# Patient Record
Sex: MALE | Race: WHITE | Employment: FULL TIME | URBAN - METROPOLITAN AREA
[De-identification: names, ages, dates, MRNs, and addresses within clinical notes are randomized per-mention and may not be internally consistent; named-entity substitution may affect disease eponyms.]

---

## 2017-01-17 PROBLEM — L72.3 SEBACEOUS CYST: Status: ACTIVE | Noted: 2017-01-17

## 2017-01-17 PROBLEM — K40.90 INGUINAL RING LAXITY: Status: ACTIVE | Noted: 2017-01-17

## 2017-01-17 PROBLEM — K64.9 HEMORRHOIDS: Status: ACTIVE | Noted: 2017-01-17

## 2017-01-17 PROBLEM — N40.1 BENIGN NON-NODULAR PROSTATIC HYPERPLASIA WITH LOWER URINARY TRACT SYMPTOMS: Chronic | Status: ACTIVE | Noted: 2017-01-17

## 2018-01-18 PROBLEM — K40.90 INGUINAL RING LAXITY: Chronic | Status: ACTIVE | Noted: 2017-01-17

## 2020-01-09 ENCOUNTER — HOSPITAL ENCOUNTER (OUTPATIENT)
Dept: GENERAL RADIOLOGY | Age: 65
Discharge: HOME | End: 2020-01-09

## 2020-01-09 DIAGNOSIS — Z00.00 ROUTINE GENERAL MEDICAL EXAMINATION AT A HEALTH CARE FACILITY: ICD-10-CM

## 2022-03-18 PROBLEM — N40.1 BENIGN NON-NODULAR PROSTATIC HYPERPLASIA WITH LOWER URINARY TRACT SYMPTOMS: Status: ACTIVE | Noted: 2017-01-17

## 2022-03-19 PROBLEM — K64.9 HEMORRHOIDS: Status: ACTIVE | Noted: 2017-01-17

## 2022-03-20 PROBLEM — L72.3 SEBACEOUS CYST: Status: ACTIVE | Noted: 2017-01-17

## 2022-03-20 PROBLEM — K40.90 INGUINAL RING LAXITY: Status: ACTIVE | Noted: 2017-01-17

## 2022-05-31 DIAGNOSIS — R05.8 ACE-INHIBITOR COUGH: Primary | ICD-10-CM

## 2022-05-31 DIAGNOSIS — R05.3 PERSISTENT DRY COUGH: ICD-10-CM

## 2022-05-31 DIAGNOSIS — T46.4X5A ACE-INHIBITOR COUGH: Primary | ICD-10-CM

## 2022-05-31 DIAGNOSIS — J92.9 PLEURAL THICKENING: ICD-10-CM

## 2022-06-06 ENCOUNTER — HOSPITAL ENCOUNTER (OUTPATIENT)
Dept: CT IMAGING | Age: 67
Discharge: HOME | End: 2022-06-09
Payer: COMMERCIAL

## 2022-06-06 DIAGNOSIS — J92.9 PLEURAL THICKENING: ICD-10-CM

## 2022-06-06 DIAGNOSIS — R05.9 COUGH: ICD-10-CM

## 2022-06-06 PROCEDURE — 71250 CT THORAX DX C-: CPT

## 2022-09-12 ENCOUNTER — TELEPHONE (OUTPATIENT)
Dept: INTERNAL MEDICINE CLINIC | Facility: CLINIC | Age: 67
End: 2022-09-12

## 2022-09-12 NOTE — TELEPHONE ENCOUNTER
----- Message from Tobi Hauser sent at 9/12/2022 12:04 PM EDT -----  Subject: Referral Request    Reason for referral request? labs for awv on 4/10/23  Provider patient wants to be referred to(if known):     Provider Phone Number(if known): Additional Information for Provider?  please call pt to make the appt when   labs have been put in  ---------------------------------------------------------------------------  --------------  [id number]    [phone]; OK to leave message on voicemail  ---------------------------------------------------------------------------  --------------

## 2022-09-12 NOTE — TELEPHONE ENCOUNTER
Pt notified that AWV does not include labs. He has scheduled an office visit also with Dr Edie Chapin.

## 2023-05-29 ASSESSMENT — PATIENT HEALTH QUESTIONNAIRE - PHQ9
SUM OF ALL RESPONSES TO PHQ QUESTIONS 1-9: 1
2. FEELING DOWN, DEPRESSED OR HOPELESS: 1
SUM OF ALL RESPONSES TO PHQ QUESTIONS 1-9: 1
1. LITTLE INTEREST OR PLEASURE IN DOING THINGS: NOT AT ALL
SUM OF ALL RESPONSES TO PHQ9 QUESTIONS 1 & 2: 1
2. FEELING DOWN, DEPRESSED OR HOPELESS: SEVERAL DAYS
SUM OF ALL RESPONSES TO PHQ QUESTIONS 1-9: 1
SUM OF ALL RESPONSES TO PHQ QUESTIONS 1-9: 1
1. LITTLE INTEREST OR PLEASURE IN DOING THINGS: 0
SUM OF ALL RESPONSES TO PHQ9 QUESTIONS 1 & 2: 1

## 2023-06-01 ENCOUNTER — HOSPITAL ENCOUNTER (OUTPATIENT)
Dept: GENERAL RADIOLOGY | Age: 68
Discharge: HOME | End: 2023-06-04

## 2023-06-01 ENCOUNTER — OFFICE VISIT (OUTPATIENT)
Dept: INTERNAL MEDICINE CLINIC | Facility: CLINIC | Age: 68
End: 2023-06-01
Payer: MEDICARE

## 2023-06-01 VITALS
SYSTOLIC BLOOD PRESSURE: 134 MMHG | DIASTOLIC BLOOD PRESSURE: 70 MMHG | HEART RATE: 81 BPM | HEIGHT: 74 IN | OXYGEN SATURATION: 98 % | BODY MASS INDEX: 26.21 KG/M2 | WEIGHT: 204.2 LBS

## 2023-06-01 DIAGNOSIS — Z00.00 ANNUAL PHYSICAL EXAM: ICD-10-CM

## 2023-06-01 DIAGNOSIS — N40.0 BENIGN PROSTATIC HYPERPLASIA WITHOUT LOWER URINARY TRACT SYMPTOMS: ICD-10-CM

## 2023-06-01 DIAGNOSIS — Z11.59 NEED FOR HEPATITIS C SCREENING TEST: Primary | ICD-10-CM

## 2023-06-01 DIAGNOSIS — R05.9 COUGH, UNSPECIFIED TYPE: ICD-10-CM

## 2023-06-01 DIAGNOSIS — Z11.59 NEED FOR HEPATITIS C SCREENING TEST: ICD-10-CM

## 2023-06-01 LAB
ALBUMIN SERPL-MCNC: 3.8 G/DL (ref 3.2–4.6)
ALBUMIN/GLOB SERPL: 1.5 (ref 0.4–1.6)
ALP SERPL-CCNC: 52 U/L (ref 50–136)
ALT SERPL-CCNC: 38 U/L (ref 12–65)
ANION GAP SERPL CALC-SCNC: 6 MMOL/L (ref 2–11)
APPEARANCE UR: CLEAR
AST SERPL-CCNC: 27 U/L (ref 15–37)
BACTERIA URNS QL MICRO: NEGATIVE /HPF
BASOPHILS # BLD: 0 K/UL (ref 0–0.2)
BASOPHILS NFR BLD: 1 % (ref 0–2)
BILIRUB SERPL-MCNC: 0.9 MG/DL (ref 0.2–1.1)
BILIRUB UR QL: NEGATIVE
BUN SERPL-MCNC: 15 MG/DL (ref 8–23)
CALCIUM SERPL-MCNC: 9.6 MG/DL (ref 8.3–10.4)
CASTS URNS QL MICRO: NORMAL /LPF (ref 0–2)
CHLORIDE SERPL-SCNC: 108 MMOL/L (ref 101–110)
CHOLEST SERPL-MCNC: 153 MG/DL
CO2 SERPL-SCNC: 27 MMOL/L (ref 21–32)
COLOR UR: NORMAL
CREAT SERPL-MCNC: 1 MG/DL (ref 0.8–1.5)
DIFFERENTIAL METHOD BLD: ABNORMAL
EOSINOPHIL # BLD: 0.1 K/UL (ref 0–0.8)
EOSINOPHIL NFR BLD: 1 % (ref 0.5–7.8)
EPI CELLS #/AREA URNS HPF: NORMAL /HPF (ref 0–5)
ERYTHROCYTE [DISTWIDTH] IN BLOOD BY AUTOMATED COUNT: 13.1 % (ref 11.9–14.6)
GLOBULIN SER CALC-MCNC: 2.5 G/DL (ref 2.8–4.5)
GLUCOSE SERPL-MCNC: 90 MG/DL (ref 65–100)
GLUCOSE UR STRIP.AUTO-MCNC: NEGATIVE MG/DL
HCT VFR BLD AUTO: 45.5 % (ref 41.1–50.3)
HCV AB SER QL: NONREACTIVE
HDLC SERPL-MCNC: 60 MG/DL (ref 40–60)
HDLC SERPL: 2.6
HGB BLD-MCNC: 15 G/DL (ref 13.6–17.2)
HGB UR QL STRIP: NEGATIVE
IMM GRANULOCYTES # BLD AUTO: 0 K/UL (ref 0–0.5)
IMM GRANULOCYTES NFR BLD AUTO: 0 % (ref 0–5)
KETONES UR QL STRIP.AUTO: NEGATIVE MG/DL
LDLC SERPL CALC-MCNC: 82.2 MG/DL
LEUKOCYTE ESTERASE UR QL STRIP.AUTO: NEGATIVE
LYMPHOCYTES # BLD: 1 K/UL (ref 0.5–4.6)
LYMPHOCYTES NFR BLD: 22 % (ref 13–44)
MCH RBC QN AUTO: 31.1 PG (ref 26.1–32.9)
MCHC RBC AUTO-ENTMCNC: 33 G/DL (ref 31.4–35)
MCV RBC AUTO: 94.2 FL (ref 82–102)
MONOCYTES # BLD: 0.7 K/UL (ref 0.1–1.3)
MONOCYTES NFR BLD: 14 % (ref 4–12)
MUCOUS THREADS URNS QL MICRO: 0 /LPF
NEUTS SEG # BLD: 2.9 K/UL (ref 1.7–8.2)
NEUTS SEG NFR BLD: 61 % (ref 43–78)
NITRITE UR QL STRIP.AUTO: NEGATIVE
NRBC # BLD: 0 K/UL (ref 0–0.2)
PH UR STRIP: 6.5 (ref 5–9)
PLATELET # BLD AUTO: 139 K/UL (ref 150–450)
PMV BLD AUTO: 11.3 FL (ref 9.4–12.3)
POTASSIUM SERPL-SCNC: 4.1 MMOL/L (ref 3.5–5.1)
PROT SERPL-MCNC: 6.3 G/DL (ref 6.3–8.2)
PROT UR STRIP-MCNC: NEGATIVE MG/DL
RBC # BLD AUTO: 4.83 M/UL (ref 4.23–5.6)
RBC #/AREA URNS HPF: NORMAL /HPF (ref 0–5)
SODIUM SERPL-SCNC: 141 MMOL/L (ref 133–143)
SP GR UR REFRACTOMETRY: 1.01 (ref 1–1.02)
TRIGL SERPL-MCNC: 54 MG/DL (ref 35–150)
TSH, 3RD GENERATION: 1.17 UIU/ML (ref 0.36–3.74)
URINE CULTURE IF INDICATED: NORMAL
UROBILINOGEN UR QL STRIP.AUTO: 0.2 EU/DL (ref 0.2–1)
VLDLC SERPL CALC-MCNC: 10.8 MG/DL (ref 6–23)
WBC # BLD AUTO: 4.7 K/UL (ref 4.3–11.1)
WBC URNS QL MICRO: NORMAL /HPF (ref 0–4)

## 2023-06-01 PROCEDURE — 99397 PER PM REEVAL EST PAT 65+ YR: CPT | Performed by: INTERNAL MEDICINE

## 2023-06-01 ASSESSMENT — ENCOUNTER SYMPTOMS
EYE PAIN: 0
WHEEZING: 0
EYE ITCHING: 0
ABDOMINAL PAIN: 0
ANAL BLEEDING: 0
NAUSEA: 0
DIARRHEA: 0
RHINORRHEA: 0
RESPIRATORY NEGATIVE: 1
EYE DISCHARGE: 0
CHOKING: 0
COLOR CHANGE: 0
STRIDOR: 0
ABDOMINAL DISTENTION: 0
BLOOD IN STOOL: 0
EYE REDNESS: 0
GASTROINTESTINAL NEGATIVE: 1
EYES NEGATIVE: 1
SINUS PRESSURE: 0
CONSTIPATION: 0
CHEST TIGHTNESS: 0
SHORTNESS OF BREATH: 0
BACK PAIN: 0
RECTAL PAIN: 0
ALLERGIC/IMMUNOLOGIC NEGATIVE: 1
COUGH: 0

## 2023-06-01 NOTE — PROGRESS NOTES
FOLLOW UP VISIT    Subjective:    Scotty Nguyen (: 1955) is a 74 y.o., male,   Chief Complaint   Patient presents with    Annual Exam     Fasting for labs       HPI:  HPI  Very nice 74year old in for cpe    The following portions of the patient's history were reviewed and updated as appropriate:      Past Medical History:   Diagnosis Date    Acute bronchitis     Acute prostatitis     Chronic rhinitis     Elevated prostate specific antigen (PSA)     Generalized osteoarthrosis, involving multiple sites     Insomnia 2015    Insomnia with sleep apnea, unspecified     Lumbago     Mitral regurgitation     Osteoarthritis 2015    Other abnormality of urination(788.69)     Pain in joint, multiple sites     Plantar fascial fibromatosis        Past Surgical History:   Procedure Laterality Date    COLONOSCOPY      neg    GI      HEMORRHOID SURGERY Bilateral 2017    LIPOMA RESECTION Right 1996    lower chest     TONSILLECTOMY      in childhood    Tran Wagner 15       Family History   Problem Relation Age of Onset    Obesity Sister     Coronary Art Dis Paternal Aunt     Cancer Mother         Skin, face    Osteoarthritis Mother     Hypertension Mother     Arthritis Mother     High Blood Pressure Mother     Diabetes Other         several family members on maternal side    Cancer Father         Skin, face    Hypertension Father     Atrial Fibrillation Father     Hearing Loss Father         Likely related to recreational firearm use    High Blood Pressure Father     High Cholesterol Father     Vision Loss Father         Macular degeneration; caught early, being treated    Diabetes Maternal Grandmother        Social History     Socioeconomic History    Marital status:      Spouse name: Not on file    Number of children: Not on file    Years of education: Not on file    Highest education level: Not on file   Occupational History    Not on file   Tobacco Use    Smoking status:

## 2023-06-02 ENCOUNTER — TELEPHONE (OUTPATIENT)
Dept: INTERNAL MEDICINE CLINIC | Facility: CLINIC | Age: 68
End: 2023-06-02

## 2023-06-02 DIAGNOSIS — D69.6 LOW PLATELET COUNT (HCC): Primary | ICD-10-CM

## 2023-06-02 LAB
PSA FREE MFR SERPL: 25.6 %
PSA FREE SERPL-MCNC: 1.1 NG/ML
PSA SERPL-MCNC: 4.3 NG/ML

## 2023-06-05 ENCOUNTER — PATIENT MESSAGE (OUTPATIENT)
Dept: INTERNAL MEDICINE CLINIC | Facility: CLINIC | Age: 68
End: 2023-06-05

## 2023-06-05 DIAGNOSIS — R97.20 HIGH PROSTATE SPECIFIC ANTIGEN (PSA): Primary | ICD-10-CM

## 2023-06-05 NOTE — TELEPHONE ENCOUNTER
From: Susanne Potts  To: Dr. Kiki Mehta: 6/5/2023 3:04 PM EDT  Subject: PSA results    I noted that my recent PSA result (4.3) is nominally above the 4.0 \"normal max\". My understanding of the results is results is that a new test method was used and that a new baseline needed to be established, so I'm NOT overly concerned about being above 4.0. In the past I've had a 4.0 PSA result which dropped in subsequent tests. Also, the free PSA > 25% indicates a lower likelyhood of prostate cancer. We also discussed during the physical that a PSA result around 4.2 range wasn't something that I was going to be concerned about. Sisi Mckenzie left a message with my wife that said you wanted a virtual call. At this point, I'm not yet seeing the value of such a call. However, I am open to getting another PSA test later this year. Can we agree that the next action would be to get another PSA in several months (September-November) at the same time that I get blood drawn for the additional platelets test you wanted?

## 2023-09-12 ENCOUNTER — TELEMEDICINE (OUTPATIENT)
Dept: INTERNAL MEDICINE CLINIC | Facility: CLINIC | Age: 68
End: 2023-09-12
Payer: MEDICARE

## 2023-09-12 DIAGNOSIS — R97.20 ELEVATED PSA: Primary | ICD-10-CM

## 2023-09-12 DIAGNOSIS — D69.6 LOW PLATELET COUNT (HCC): ICD-10-CM

## 2023-09-12 PROCEDURE — 1123F ACP DISCUSS/DSCN MKR DOCD: CPT | Performed by: INTERNAL MEDICINE

## 2023-09-12 PROCEDURE — 99213 OFFICE O/P EST LOW 20 MIN: CPT | Performed by: INTERNAL MEDICINE

## 2023-09-12 ASSESSMENT — ENCOUNTER SYMPTOMS
GASTROINTESTINAL NEGATIVE: 1
RESPIRATORY NEGATIVE: 1

## 2023-09-14 DIAGNOSIS — R97.20 ELEVATED PSA: ICD-10-CM

## 2023-09-14 DIAGNOSIS — D69.6 LOW PLATELET COUNT (HCC): ICD-10-CM

## 2023-09-14 LAB — PLATELET # BLD AUTO: 137 K/UL (ref 150–450)

## 2023-09-15 ENCOUNTER — PATIENT MESSAGE (OUTPATIENT)
Dept: INTERNAL MEDICINE CLINIC | Facility: CLINIC | Age: 68
End: 2023-09-15

## 2023-09-15 DIAGNOSIS — R97.20 ELEVATED PSA: Primary | ICD-10-CM

## 2023-09-15 LAB
PSA FREE MFR SERPL: 25 %
PSA FREE SERPL-MCNC: 0.9 NG/ML
PSA SERPL-MCNC: 3.6 NG/ML

## 2023-09-15 NOTE — RESULT ENCOUNTER NOTE
Called and spoke with patient's wife and informed her regarding the lab result note per   Dr. Bozena Clark. Patient's wife verbalized understanding and stated that she would relay the message to the patient.

## 2023-09-15 NOTE — TELEPHONE ENCOUNTER
From: Gary Tierney  To: Dr. Sadaf Bass: 9/15/2023 10:49 AM EDT  Subject: Michael Garrett referral request    Please provide a referral for Dr. Thomas Gan (Urologist), [facility]. I met Dr. Cj Quick before he went to med school. Online info indicates he is affiliated with hospitals in [facility] and at [facility].     Thank you  Darin Worthy

## 2023-09-20 ENCOUNTER — TELEPHONE (OUTPATIENT)
Dept: INTERNAL MEDICINE CLINIC | Facility: CLINIC | Age: 68
End: 2023-09-20

## 2023-09-20 NOTE — TELEPHONE ENCOUNTER
Spoke to patient regarding his request for additional DX on referral to urologist patient told that Dr. Tamara Blackwell is out of office until 9/25/23 patient that is fine because will be gone for the next week.